# Patient Record
Sex: FEMALE | Race: BLACK OR AFRICAN AMERICAN | Employment: FULL TIME | URBAN - METROPOLITAN AREA
[De-identification: names, ages, dates, MRNs, and addresses within clinical notes are randomized per-mention and may not be internally consistent; named-entity substitution may affect disease eponyms.]

---

## 2018-08-14 ENCOUNTER — HOSPITAL ENCOUNTER (OUTPATIENT)
Dept: MRI IMAGING | Age: 48
Discharge: HOME | End: 2018-08-14
Attending: PHYSICAL MEDICINE & REHABILITATION
Payer: OTHER GOVERNMENT

## 2018-08-14 DIAGNOSIS — M51.26 DISPLACEMENT OF LUMBAR INTERVERTEBRAL DISC WITHOUT MYELOPATHY: ICD-10-CM

## 2018-08-14 PROCEDURE — 72148 MRI LUMBAR SPINE W/O DYE: CPT

## 2019-07-12 ENCOUNTER — HOSPITAL ENCOUNTER (OUTPATIENT)
Dept: CT IMAGING | Age: 49
Discharge: HOME | End: 2019-07-12
Payer: OTHER GOVERNMENT

## 2019-07-12 VITALS — DIASTOLIC BLOOD PRESSURE: 83 MMHG | SYSTOLIC BLOOD PRESSURE: 116 MMHG | HEART RATE: 74 BPM

## 2019-07-12 DIAGNOSIS — I44.7 LBBB (LEFT BUNDLE BRANCH BLOCK): ICD-10-CM

## 2019-07-12 DIAGNOSIS — I10 BENIGN ESSENTIAL HYPERTENSION: ICD-10-CM

## 2019-07-12 PROCEDURE — 75574 CT ANGIO HRT W/3D IMAGE: CPT

## 2019-07-12 PROCEDURE — 74011636320 HC RX REV CODE- 636/320: Performed by: INTERNAL MEDICINE

## 2019-07-12 PROCEDURE — 74011250637 HC RX REV CODE- 250/637: Performed by: INTERNAL MEDICINE

## 2019-07-12 PROCEDURE — 74011250636 HC RX REV CODE- 250/636: Performed by: INTERNAL MEDICINE

## 2019-07-12 RX ORDER — NITROGLYCERIN 0.4 MG/1
TABLET SUBLINGUAL
Status: DISCONTINUED
Start: 2019-07-12 | End: 2019-07-12 | Stop reason: WASHOUT

## 2019-07-12 RX ORDER — SODIUM CHLORIDE 9 MG/ML
50 INJECTION, SOLUTION INTRAVENOUS
Status: COMPLETED | OUTPATIENT
Start: 2019-07-12 | End: 2019-07-12

## 2019-07-12 RX ORDER — SODIUM CHLORIDE 0.9 % (FLUSH) 0.9 %
10 SYRINGE (ML) INJECTION
Status: COMPLETED | OUTPATIENT
Start: 2019-07-12 | End: 2019-07-12

## 2019-07-12 RX ORDER — IODIXANOL 320 MG/ML
100 INJECTION, SOLUTION INTRAVASCULAR ONCE
Status: COMPLETED | OUTPATIENT
Start: 2019-07-12 | End: 2019-07-12

## 2019-07-12 RX ORDER — NITROGLYCERIN 0.4 MG/1
0.4 TABLET SUBLINGUAL ONCE
Status: COMPLETED | OUTPATIENT
Start: 2019-07-12 | End: 2019-07-12

## 2019-07-12 RX ADMIN — NITROGLYCERIN 0.4 MG: 0.4 TABLET SUBLINGUAL at 08:34

## 2019-07-12 RX ADMIN — Medication 10 ML: at 09:07

## 2019-07-12 RX ADMIN — IODIXANOL 100 ML: 320 INJECTION, SOLUTION INTRAVASCULAR at 09:07

## 2019-07-12 RX ADMIN — SODIUM CHLORIDE 50 ML/HR: 900 INJECTION, SOLUTION INTRAVENOUS at 09:07

## 2019-07-12 NOTE — PROGRESS NOTES
0820- Pt to CT room. IV SL established by CT Tech. Pt tolerated well. Pt took beta blocker PTA. HR-68    8732- CT scan begins. 5119- NTG given. IV patent. No complaints. HR-68    0840- CT scan complete. Pt had appx 3 beat abnormal EKG rhythm- unable to determine type due to distance from monitor outside of scan room- immediate return to NSR. Pt did not report feeling any issue. Could present motion on scan. Pt tolerated procedure well. 0507- IV D/C'd intact without problem. D/C instructions reviewed with pt and copy given. Verbalized understanding. D/C'd amb, stable, NAD.

## 2019-07-24 ENCOUNTER — HOSPITAL ENCOUNTER (OUTPATIENT)
Dept: CT IMAGING | Age: 49
Discharge: HOME | End: 2019-07-24

## 2019-07-24 ENCOUNTER — TELEPHONE (OUTPATIENT)
Dept: MAMMOGRAPHY | Age: 49
End: 2019-07-24

## 2019-07-24 DIAGNOSIS — I44.7 LBBB (LEFT BUNDLE BRANCH BLOCK): ICD-10-CM

## 2019-07-24 DIAGNOSIS — I10 BENIGN ESSENTIAL HYPERTENSION: ICD-10-CM

## 2019-07-24 RX ORDER — SODIUM CHLORIDE 0.9 % (FLUSH) 0.9 %
10 SYRINGE (ML) INJECTION ONCE
Status: DISPENSED | OUTPATIENT
Start: 2019-07-24 | End: 2019-07-24

## 2019-07-24 RX ORDER — IODIXANOL 320 MG/ML
100 INJECTION, SOLUTION INTRAVASCULAR ONCE
Status: DISPENSED | OUTPATIENT
Start: 2019-07-24 | End: 2019-07-24

## 2019-07-24 RX ORDER — NITROGLYCERIN 0.4 MG/1
0.4 TABLET SUBLINGUAL AS NEEDED
Status: DISCONTINUED | OUTPATIENT
Start: 2019-07-24 | End: 2019-07-28 | Stop reason: HOSPADM

## 2019-07-24 NOTE — TELEPHONE ENCOUNTER
Pt unable to complete repeat CCTA testing today as requested by Dr. Roberta Schilder due to issues with IV. Pt is extremely hard IV stick. RT Anabella able to establish IV on second attempt to right wrist area, however due to small vein size and required high rate of flow for CCTA study, pt experienced significant pain on test saline injection. Pt also experienced panic attack-like symptoms with the IV site pain issue and requested to end study at that time. No contrast was administered. IV was patent without infiltration, however pt did experience pain and pressure that she felt was not tolerable. PT was comforted, IV D/C'd without issue, intact, and IV site was asymptomatic. Offered pt juice, water, snack. Pt declined. Dr. Roberta Schilder was contacted by RT Abbie to notify of inability to complete study as requested. Dr. Roberta Schilder requested to to have PICC line placed and/or US guided IV placement at this time. RT Bradford explained request of Dr. Roberta Schilder to pt. Pt declines any further testing today. Pt will contact Dr. Lo Drew office to discuss further options. Pt has contact information for RT Bradford for any questions or problems.

## 2019-08-14 ENCOUNTER — APPOINTMENT (OUTPATIENT)
Dept: CT IMAGING | Age: 49
End: 2019-08-14
Attending: INTERNAL MEDICINE
Payer: OTHER GOVERNMENT

## 2019-08-14 ENCOUNTER — HOSPITAL ENCOUNTER (OUTPATIENT)
Dept: INTERVENTIONAL RADIOLOGY/VASCULAR | Age: 49
Discharge: HOME | End: 2019-08-14
Attending: INTERNAL MEDICINE | Admitting: RADIOLOGY
Payer: OTHER GOVERNMENT

## 2019-08-14 VITALS
OXYGEN SATURATION: 97 % | DIASTOLIC BLOOD PRESSURE: 76 MMHG | BODY MASS INDEX: 32.95 KG/M2 | WEIGHT: 205 LBS | HEIGHT: 66 IN | RESPIRATION RATE: 20 BRPM | SYSTOLIC BLOOD PRESSURE: 122 MMHG | TEMPERATURE: 98.2 F | HEART RATE: 66 BPM

## 2019-08-14 DIAGNOSIS — Z78.9 DIFFICULT INTRAVENOUS ACCESS: ICD-10-CM

## 2019-08-14 PROCEDURE — 74011250636 HC RX REV CODE- 250/636: Performed by: RADIOLOGY

## 2019-08-14 PROCEDURE — 74011250637 HC RX REV CODE- 250/637

## 2019-08-14 PROCEDURE — 75574 CT ANGIO HRT W/3D IMAGE: CPT

## 2019-08-14 PROCEDURE — 36573 INSJ PICC RS&I 5 YR+: CPT

## 2019-08-14 PROCEDURE — 74011250636 HC RX REV CODE- 250/636: Performed by: INTERNAL MEDICINE

## 2019-08-14 PROCEDURE — 74011636320 HC RX REV CODE- 636/320: Performed by: INTERNAL MEDICINE

## 2019-08-14 PROCEDURE — C1751 CATH, INF, PER/CENT/MIDLINE: HCPCS

## 2019-08-14 RX ORDER — NITROGLYCERIN 0.4 MG/1
0.4 TABLET SUBLINGUAL AS NEEDED
Status: DISCONTINUED | OUTPATIENT
Start: 2019-08-14 | End: 2019-08-14

## 2019-08-14 RX ORDER — IODIXANOL 320 MG/ML
100 INJECTION, SOLUTION INTRAVASCULAR
Status: COMPLETED | OUTPATIENT
Start: 2019-08-14 | End: 2019-08-14

## 2019-08-14 RX ORDER — NITROGLYCERIN 0.4 MG/1
0.4 TABLET SUBLINGUAL AS NEEDED
Status: DISCONTINUED | OUTPATIENT
Start: 2019-08-14 | End: 2019-08-14 | Stop reason: HOSPADM

## 2019-08-14 RX ORDER — LIDOCAINE HYDROCHLORIDE 10 MG/ML
10 INJECTION, SOLUTION EPIDURAL; INFILTRATION; INTRACAUDAL; PERINEURAL ONCE
Status: COMPLETED | OUTPATIENT
Start: 2019-08-14 | End: 2019-08-14

## 2019-08-14 RX ORDER — NITROGLYCERIN 0.4 MG/1
TABLET SUBLINGUAL
Status: COMPLETED
Start: 2019-08-14 | End: 2019-08-14

## 2019-08-14 RX ORDER — IODIXANOL 320 MG/ML
100 INJECTION, SOLUTION INTRAVASCULAR
Status: DISCONTINUED | OUTPATIENT
Start: 2019-08-14 | End: 2019-08-14

## 2019-08-14 RX ORDER — SODIUM CHLORIDE 9 MG/ML
50 INJECTION, SOLUTION INTRAVENOUS ONCE
Status: COMPLETED | OUTPATIENT
Start: 2019-08-14 | End: 2019-08-14

## 2019-08-14 RX ORDER — SODIUM CHLORIDE 0.9 % (FLUSH) 0.9 %
10 SYRINGE (ML) INJECTION ONCE
Status: COMPLETED | OUTPATIENT
Start: 2019-08-14 | End: 2019-08-14

## 2019-08-14 RX ADMIN — NITROGLYCERIN 0.4 MG: 0.4 TABLET SUBLINGUAL at 12:05

## 2019-08-14 RX ADMIN — Medication 10 ML: at 13:40

## 2019-08-14 RX ADMIN — LIDOCAINE HYDROCHLORIDE 7 ML: 10 INJECTION, SOLUTION EPIDURAL; INFILTRATION; INTRACAUDAL; PERINEURAL at 10:42

## 2019-08-14 RX ADMIN — IODIXANOL 100 ML: 320 INJECTION, SOLUTION INTRAVASCULAR at 13:40

## 2019-08-14 RX ADMIN — SODIUM CHLORIDE 50 ML/HR: 900 INJECTION, SOLUTION INTRAVENOUS at 13:40

## 2019-08-14 NOTE — PROGRESS NOTES
1155- Pt to CT room. IV PICC line established PTA right arm by [facility] angio in place and patent. Pt took beta blocker PTA. HR-72    1202- CT scan begins. 1205- NTG given. IV patent. No complaints. HR-66    1215- CT scan complete. Pt tolerated procedure well. 1230- IV PICC line removed intact without problem. Pressure held for 10 minutes. No bleeding noted. Wrapped snugly with coban and instructed to keep in place for at least 30 minutes-1hr. Pt to call angio at [facility] or CT dept here for any problems or questions. CCTA D/C instructions also reviewed with pt and copy given. Verbalized understanding. D/C'd amb, stable, NAD. Spouse in waiting room to drive pt.

## 2019-08-14 NOTE — ROUTINE PROCESS
Pt had double lumen PICC line placed without problems; placement confirmed by fluoroscopy by radiologist.  Pt to go to [address] [place] [address] for coronary CTA study and then plan is to have PICC line removed by staff at [address] [place] [address] per Dr. Artemio Cervantes. Dr. Artemio Cervantes spoke with staff at [address] [place] [address].

## 2019-08-14 NOTE — DISCHARGE INSTRUCTIONS
[address] CATHETER REMOVAL  DISCHARGE INSTRUCTIONS    Home Care Instructions: You can resume your regular diet and medication regimen. Do not drink alcohol, drive, or make any important legal decisions in the next 24 hours. Do not lift anything heavier than a gallon of milk, or do anything strenuous for the next 24 hours. You will notice a dressing over the site of the removal. This dressing should stay in place until the site is healed. The dressing should be changed at least every 48 hours. You should change the dressing sooner if it becomes soiled or wet. The nurse who discharges you to home should review with you any wound care instructions. Resume your normal level of activity slowly. You may shower after 24 hours, but do not take a bath, swim or immerse yourself in water until the site has healed, and keep the dressing dry with plastic wrap while showering. The site may ooze for a couple of days. This drainage should lessen with each passing day. Call If:     You should call your Physician and/or the Radiology Nurse if you have any bleeding other than a small spot on your bandage. Call if you have any signs of infection, fever, or increased pain at the site of the tube. Call if the oozing increases, if it changes color, or begins to have an odor. Follow-Up Instructions: Please see your ordering doctor as he/she has requested. To Reach Us:  Side effects of sedation medications and other medications used today have been reviewed. Notify us of nausea, itching, hives, dizziness, or anything else out of the ordinary. Should you experience any of these significant changes, please call [phone] between the hours of 7:30 am and 10 pm or [phone] after hours.  After hours, ask the  to page the [address] [place] [address] Technologist, and describe the problem to the technologist.           Patient Signature:  Date: 8/14/2019  Discharging Nurse: Abimael Li RN

## 2019-08-20 NOTE — OP NOTES
[facility]  OPERATIVE REPORT    Name:  Winifred Whitman  MR#:  [id number]  :  1970  ACCOUNT #:  [de-identified]  DATE OF SERVICE:  2019    PREOPERATIVE DIAGNOSIS:Chest pain. POSTOPERATIVE DIAGNOSIS: Chest pain. PROCEDURE PERFORMED:  Coronary computed tomography angiogram.    SURGEON:  June Mae MD    ASSISTANT:  none    ANESTHESIA:  none    COMPLICATIONS:  none    SPECIMENS REMOVED: none. IMPLANTS:  none    ESTIMATED BLOOD LOSS: 0cc. INDICATION:  Chest pain. The extracardiac portions of the study are dictated separately by radiology services. CORONARY ANATOMY:  1. The left main coronary artery rises normally from sinus of Valsalva. This artery is without stenosis. 2.  The LAD is a moderate size vessel, which extended around the apex. The LAD gives off one moderate-size bifurcating diagonal branch. The mid portion of the LAD is not well seen, because of technical problems with the scan. The proximal and distal portions of the vessel appear intact without any atherosclerotic disease seen. 3.  The intermediate artery with a small-to-moderate size vessel without atherosclerotic disease seen. 4.  The circumflex is a moderate size non-dominant vessel, which gives off moderate size obtuse marginal branch and a small posterolateral branch. The parts of mid portion circumflex is not well seen due to technical difficulties with the scan. The remaining portions of the vessel are intact without atherosclerotic disease. 5.  The right coronary artery is a moderate size dominant vessel, which gives off a moderate sized  posterolateral branch and small PDA branch, has involved segment on the mid right coronary artery was not well visualized. The proximal and distal portions of the vessel well visualized without atherosclerotic disease. LEFT VENTRICULAR ANALYSIS:  The left ventricle is normal in size.   The left ventricular myocardium appeared normal.  The left ventricular ejection fraction is 60%. There were no regional wall motion abnormalities. CONCLUSION:  1. Right dominant coronary artery system. 2.  No atherosclerotic disease seen, although there are technical difficulties with portions of the native coronary artery is not well visualized. 3.  Normal left ventricular analysis with ejection fraction of 60% and without any regional wall motion abnormalities.         MD SABA [email or web address]  D:  08/19/2019 21:44  T:  08/20/2019 4:51  JOB #:  [id number]  CC:  Angela Suárez MD